# Patient Record
Sex: MALE | Race: WHITE | NOT HISPANIC OR LATINO | Employment: FULL TIME | URBAN - METROPOLITAN AREA
[De-identification: names, ages, dates, MRNs, and addresses within clinical notes are randomized per-mention and may not be internally consistent; named-entity substitution may affect disease eponyms.]

---

## 2019-09-20 ENCOUNTER — OFFICE VISIT (OUTPATIENT)
Dept: FAMILY MEDICINE CLINIC | Facility: CLINIC | Age: 39
End: 2019-09-20

## 2019-09-20 VITALS
OXYGEN SATURATION: 98 % | BODY MASS INDEX: 36.82 KG/M2 | TEMPERATURE: 98.1 F | RESPIRATION RATE: 15 BRPM | HEART RATE: 62 BPM | WEIGHT: 263 LBS | SYSTOLIC BLOOD PRESSURE: 140 MMHG | HEIGHT: 71 IN | DIASTOLIC BLOOD PRESSURE: 82 MMHG

## 2019-09-20 DIAGNOSIS — Z13.1 SCREENING FOR DIABETES MELLITUS: ICD-10-CM

## 2019-09-20 DIAGNOSIS — Z13.29 SCREENING FOR THYROID DISORDER: ICD-10-CM

## 2019-09-20 DIAGNOSIS — Z80.42 FAMILY HX OF PROSTATE CANCER: ICD-10-CM

## 2019-09-20 DIAGNOSIS — Z12.5 SCREENING FOR PROSTATE CANCER: ICD-10-CM

## 2019-09-20 DIAGNOSIS — Z13.220 SCREENING FOR LIPID DISORDERS: ICD-10-CM

## 2019-09-20 DIAGNOSIS — Z00.00 ENCOUNTER FOR HEALTH MAINTENANCE EXAMINATION: Primary | ICD-10-CM

## 2019-09-20 PROCEDURE — 99385 PREV VISIT NEW AGE 18-39: CPT | Performed by: FAMILY MEDICINE

## 2019-09-20 NOTE — PROGRESS NOTES
Torres Lozada is a 39 y.o. male.     Chief Complaint   Patient presents with   • Annual Exam     new pt to be established       HPI     Patient presents the office today as a new patient for a health maintenance exam.  Has no real significant personal history of illness.  Does have a very significant family history of prostate cancer in his father was diagnosed in his early 50s.  Also has a significant family history of cardiovascular disease, depression, anxiety.  Patient does not adhere to proper diet and exercise.  He is a former smoker and tobacco chewer.  Also admits to smoking marijuana.  Drinks 2-10 alcoholic beverages daily.    The following portions of the patient's history were reviewed and updated as appropriate: allergies, current medications, past family history, past medical history, past social history, past surgical history and problem list.    Review of Systems   Constitutional: Negative.    HENT: Positive for postnasal drip and sinus pressure.    Eyes: Negative.    Respiratory: Positive for chest tightness.         When he sneezes at times     Cardiovascular: Negative.    Gastrointestinal: Positive for diarrhea.   Endocrine: Negative.    Genitourinary: Negative.    Musculoskeletal: Negative.    Skin: Negative.    Allergic/Immunologic: Negative.    Neurological: Negative.    Psychiatric/Behavioral: Negative.    All other systems reviewed and are negative.    I have reviewed and agree with documentation of above ROS.    Objective  Vitals:    09/20/19 1241   BP: 140/82   Pulse: 62   Resp: 15   Temp: 98.1 °F (36.7 °C)   SpO2: 98%       Physical Exam   Constitutional: He is oriented to person, place, and time. He appears well-developed and well-nourished. No distress.   HENT:   Head: Normocephalic and atraumatic.   Right Ear: External ear normal.   Left Ear: External ear normal.   Nose: Nose normal.   Mouth/Throat: Oropharynx is clear and moist.   Eyes: Conjunctivae and EOM are normal. Pupils  are equal, round, and reactive to light. Right eye exhibits no discharge. Left eye exhibits no discharge. No scleral icterus.   Neck: Normal range of motion. Neck supple.   Cardiovascular: Normal rate, regular rhythm and normal heart sounds. Exam reveals no friction rub.   No murmur heard.  Pulmonary/Chest: Effort normal and breath sounds normal. No respiratory distress. He has no wheezes. He has no rales.   Abdominal: Soft. Bowel sounds are normal. He exhibits no distension. There is no tenderness. There is no rebound and no guarding.   Lymphadenopathy:     He has no cervical adenopathy.   Neurological: He is alert and oriented to person, place, and time.   Skin: Skin is warm and dry. He is not diaphoretic.   Nursing note and vitals reviewed.      No current outpatient medications on file.  Current outpatient and discharge medications have been reconciled for the patient.  Reviewed by: Kadeem Ochoa MD      Procedures    Lab Results (most recent)     Misael Macdonald was seen today for annual exam.    Diagnoses and all orders for this visit:    Encounter for health maintenance examination    Screening for lipid disorders  -     Lipid Panel    Screening for thyroid disorder  -     Thyroid Panel With TSH    Screening for diabetes mellitus  -     Comprehensive Metabolic Panel  -     Hemoglobin A1c    Screening for prostate cancer  -     PSA Screen    Family hx of prostate cancer  -     PSA Screen      Preventative health maintenance and screening as above.  Will communicate results to patient when available.  Advised improvements in diet and exercise and.  Advised discontinuing marijuana use as well as decreasing his overall alcohol intake.    Return for As needed.      Kadeem Ochoa MD

## 2019-09-21 LAB
ALBUMIN SERPL-MCNC: 4.4 G/DL (ref 3.5–5.2)
ALBUMIN/GLOB SERPL: 1.5 G/DL
ALP SERPL-CCNC: 70 U/L (ref 39–117)
ALT SERPL-CCNC: 22 U/L (ref 1–41)
AST SERPL-CCNC: 12 U/L (ref 1–40)
BILIRUB SERPL-MCNC: 0.4 MG/DL (ref 0.2–1.2)
BUN SERPL-MCNC: 13 MG/DL (ref 6–20)
BUN/CREAT SERPL: 13.3 (ref 7–25)
CALCIUM SERPL-MCNC: 9.1 MG/DL (ref 8.6–10.5)
CHLORIDE SERPL-SCNC: 104 MMOL/L (ref 98–107)
CHOLEST SERPL-MCNC: 201 MG/DL (ref 0–200)
CO2 SERPL-SCNC: 28.7 MMOL/L (ref 22–29)
CREAT SERPL-MCNC: 0.98 MG/DL (ref 0.76–1.27)
FT4I SERPL CALC-MCNC: 2 (ref 1.2–4.9)
GLOBULIN SER CALC-MCNC: 2.9 GM/DL
GLUCOSE SERPL-MCNC: 95 MG/DL (ref 65–99)
HBA1C MFR BLD: 5.4 % (ref 4.8–5.6)
HDLC SERPL-MCNC: 49 MG/DL (ref 40–60)
LDLC SERPL CALC-MCNC: 129 MG/DL (ref 0–100)
POTASSIUM SERPL-SCNC: 5.4 MMOL/L (ref 3.5–5.2)
PROT SERPL-MCNC: 7.3 G/DL (ref 6–8.5)
PSA SERPL-MCNC: 0.45 NG/ML (ref 0–4)
SODIUM SERPL-SCNC: 143 MMOL/L (ref 136–145)
T3RU NFR SERPL: 27 % (ref 24–39)
T4 SERPL-MCNC: 7.3 UG/DL (ref 4.5–12)
TRIGL SERPL-MCNC: 117 MG/DL (ref 0–150)
TSH SERPL DL<=0.005 MIU/L-ACNC: 0.9 UIU/ML (ref 0.45–4.5)
VLDLC SERPL CALC-MCNC: 23.4 MG/DL

## 2020-06-24 ENCOUNTER — OFFICE VISIT (OUTPATIENT)
Dept: FAMILY MEDICINE CLINIC | Facility: CLINIC | Age: 40
End: 2020-06-24

## 2020-06-24 VITALS
OXYGEN SATURATION: 98 % | DIASTOLIC BLOOD PRESSURE: 90 MMHG | HEIGHT: 71 IN | TEMPERATURE: 98 F | HEART RATE: 78 BPM | SYSTOLIC BLOOD PRESSURE: 138 MMHG | WEIGHT: 266 LBS | BODY MASS INDEX: 37.24 KG/M2

## 2020-06-24 DIAGNOSIS — R21 RASH: Primary | ICD-10-CM

## 2020-06-24 DIAGNOSIS — B35.4 TINEA CORPORIS: ICD-10-CM

## 2020-06-24 PROCEDURE — 99213 OFFICE O/P EST LOW 20 MIN: CPT | Performed by: FAMILY MEDICINE

## 2020-06-24 PROCEDURE — 96372 THER/PROPH/DIAG INJ SC/IM: CPT | Performed by: FAMILY MEDICINE

## 2020-06-24 RX ORDER — METHYLPREDNISOLONE SODIUM SUCCINATE 125 MG/2ML
125 INJECTION, POWDER, LYOPHILIZED, FOR SOLUTION INTRAMUSCULAR; INTRAVENOUS ONCE
Status: COMPLETED | OUTPATIENT
Start: 2020-06-24 | End: 2020-06-24

## 2020-06-24 RX ORDER — FLUCONAZOLE 150 MG/1
TABLET ORAL
Qty: 2 TABLET | Refills: 0 | Status: SHIPPED | OUTPATIENT
Start: 2020-06-24

## 2020-06-24 RX ORDER — CLOTRIMAZOLE AND BETAMETHASONE DIPROPIONATE 10; .64 MG/G; MG/G
CREAM TOPICAL 2 TIMES DAILY
Qty: 45 G | Refills: 1 | Status: SHIPPED | OUTPATIENT
Start: 2020-06-24 | End: 2020-07-04

## 2020-06-24 RX ADMIN — METHYLPREDNISOLONE SODIUM SUCCINATE 125 MG: 125 INJECTION, POWDER, LYOPHILIZED, FOR SOLUTION INTRAMUSCULAR; INTRAVENOUS at 11:00

## 2020-06-24 NOTE — PROGRESS NOTES
Torres Lozada is a 40 y.o. male.     Chief Complaint   Patient presents with   • Rash     poison ivy spreads in all his body        HPI     Pt is a pleasant 40 y.o. YO male here for symptoms of a rash. Noticed it initially about two weeks ago, he had been in some bushes where he thinks he may have been exposed to poison ivy, a friend who is an NP gave him a prescription for azithromycin which he took and initially he felt the rash got a bit better but now it is worse again, present across his lower abdomen, bilateral axilla, and right arm. Lesions are very itchy. He has been putting on some type of OTC cream with only a little improvement. He has had a similar rash in the past which he has attributed to poison ivy but this time it is more severe.      The following portions of the patient's history were reviewed by me and updated as appropriate: allergies, current medications, past family history, past medical history, past social history, past surgical history, and problem list.     Review of Systems   Constitutional: Negative.    HENT: Negative.    Eyes: Negative.    Respiratory: Negative.    Cardiovascular: Negative.    Gastrointestinal: Negative.    Endocrine: Negative.    Genitourinary: Negative.    Musculoskeletal: Negative.    Skin: Positive for rash.   Allergic/Immunologic: Negative.    Neurological: Negative.    Hematological: Negative.    I have reviewed and confirmed the accuracy of the ROS as documented by the MA/LPN/RN April Valencia MD    Objective  Vitals:    06/24/20 1019   BP: 138/90   Pulse: 78   Temp: 98 °F (36.7 °C)   SpO2: 98%     Body mass index is 37.1 kg/m².       Physical Exam   Constitutional: He is oriented to person, place, and time. He appears well-developed and well-nourished. No distress.   HENT:   Head: Normocephalic and atraumatic.   Nose: Nose normal.   Eyes: Conjunctivae are normal. Right eye exhibits no discharge. Left eye exhibits no discharge. No scleral icterus.    Pulmonary/Chest: Effort normal. No respiratory distress.   Neurological: He is alert and oriented to person, place, and time.   Skin: Rash noted. No erythema. No pallor.   Erythematous maculopapular rash, diffuse across the lower abdomen along the beltline/ fold under stomach as well as the right axilla. More scattered lesions on the left axilla and right arm.    Psychiatric: He has a normal mood and affect. His behavior is normal. Judgment and thought content normal.   Vitals reviewed.        Current Outpatient Medications:   •  clotrimazole-betamethasone (LOTRISONE) 1-0.05 % cream, Apply  topically to the appropriate area as directed 2 (Two) Times a Day for 10 days., Disp: 45 g, Rfl: 1  •  fluconazole (DIFLUCAN) 150 MG tablet, Take one tablet once. Repeat dose in 72 hours (three days), Disp: 2 tablet, Rfl: 0    Current Facility-Administered Medications:   •  methylPREDNISolone sodium succinate (SOLU-Medrol) injection 125 mg, 125 mg, Intramuscular, Once, April Ely MD    Procedures    Lab Results (most recent)     Misael Macdonald was seen today for rash.    Diagnoses and all orders for this visit:    Rash  -     methylPREDNISolone sodium succinate (SOLU-Medrol) injection 125 mg  -     clotrimazole-betamethasone (LOTRISONE) 1-0.05 % cream; Apply  topically to the appropriate area as directed 2 (Two) Times a Day for 10 days.    Tinea corporis  -     clotrimazole-betamethasone (LOTRISONE) 1-0.05 % cream; Apply  topically to the appropriate area as directed 2 (Two) Times a Day for 10 days.  -     fluconazole (DIFLUCAN) 150 MG tablet; Take one tablet once. Repeat dose in 72 hours (three days)    Patient is a pleasant 40-year-old male who presents today with symptoms of a rash x2 weeks that has been becoming more severe.  Patient has a history of similar rash in the past that was attributed to a contact dermatitis with the plant, he may have had this initially but I am now concerned given locations of  rash in areas of skin folds that he may have tinea corporis (treatment with the antibiotic may have made him more prone to this).  Will treat with a IM injection of Solu-Medrol in the office today as well as 2 doses of oral fluconazole.  In addition to this of send in a topical antifungal/corticosteroid cream for him to apply.  Discussed with patient if he does not have improvement over the next 2 days to call me at which point I would consider continuing him on a course of an oral steroid for about a week.      Return if symptoms worsen or fail to improve.      April Valencia MD

## 2020-10-26 ENCOUNTER — TELEPHONE (OUTPATIENT)
Dept: FAMILY MEDICINE CLINIC | Facility: CLINIC | Age: 40
End: 2020-10-26

## 2020-10-26 NOTE — TELEPHONE ENCOUNTER
"Transfered by [facility]. Informed Mr Lozada of office policy and needing to establish care with new provider. Unfortunately, unable to schedule with katie ZAMUDIO, nothing until Feb. Mr. Lozada used vulgar language, said \"thanks anyway\" and hung up. Unable to inform pt of sooner physician availability.     Message to [facility]... if  calls back he may schedule with Joey Shields MD in [place] or Aurea Ortega MD at [place]. These two physicians have the soonest availability.   "

## 2021-04-06 ENCOUNTER — BULK ORDERING (OUTPATIENT)
Dept: CASE MANAGEMENT | Facility: OTHER | Age: 41
End: 2021-04-06

## 2021-04-06 DIAGNOSIS — Z23 IMMUNIZATION DUE: ICD-10-CM
